# Patient Record
Sex: MALE | Race: WHITE | NOT HISPANIC OR LATINO | Employment: OTHER | URBAN - METROPOLITAN AREA
[De-identification: names, ages, dates, MRNs, and addresses within clinical notes are randomized per-mention and may not be internally consistent; named-entity substitution may affect disease eponyms.]

---

## 2021-09-30 ENCOUNTER — OFFICE VISIT (OUTPATIENT)
Dept: PULMONOLOGY | Facility: CLINIC | Age: 76
End: 2021-09-30

## 2021-09-30 VITALS
DIASTOLIC BLOOD PRESSURE: 80 MMHG | OXYGEN SATURATION: 96 % | BODY MASS INDEX: 26.22 KG/M2 | WEIGHT: 177 LBS | SYSTOLIC BLOOD PRESSURE: 120 MMHG | HEART RATE: 74 BPM | TEMPERATURE: 97.9 F | HEIGHT: 69 IN

## 2021-09-30 DIAGNOSIS — J44.9 COPD MIXED TYPE (HCC): ICD-10-CM

## 2021-09-30 DIAGNOSIS — Z87.891 FORMER SMOKER: ICD-10-CM

## 2021-09-30 DIAGNOSIS — R06.02 SHORTNESS OF BREATH: Primary | ICD-10-CM

## 2021-09-30 PROBLEM — R06.09 DYSPNEA ON EXERTION: Status: ACTIVE | Noted: 2021-09-30

## 2021-09-30 PROCEDURE — 94726 PLETHYSMOGRAPHY LUNG VOLUMES: CPT | Performed by: INTERNAL MEDICINE

## 2021-09-30 PROCEDURE — 99204 OFFICE O/P NEW MOD 45 MIN: CPT | Performed by: INTERNAL MEDICINE

## 2021-09-30 PROCEDURE — 94729 DIFFUSING CAPACITY: CPT | Performed by: INTERNAL MEDICINE

## 2021-09-30 PROCEDURE — 94060 EVALUATION OF WHEEZING: CPT | Performed by: INTERNAL MEDICINE

## 2021-09-30 RX ORDER — PANTOPRAZOLE SODIUM 40 MG/1
40 TABLET, DELAYED RELEASE ORAL DAILY
COMMUNITY
Start: 2021-09-16

## 2021-09-30 RX ORDER — ASPIRIN 81 MG/1
TABLET ORAL
COMMUNITY

## 2021-09-30 RX ORDER — TRIAMTERENE AND HYDROCHLOROTHIAZIDE 37.5; 25 MG/1; MG/1
1 TABLET ORAL DAILY
COMMUNITY
Start: 2021-07-26

## 2021-09-30 RX ORDER — ALBUTEROL SULFATE 90 UG/1
4 AEROSOL, METERED RESPIRATORY (INHALATION) ONCE
Status: COMPLETED | OUTPATIENT
Start: 2021-09-30 | End: 2021-09-30

## 2021-09-30 RX ORDER — ALBUTEROL SULFATE 90 UG/1
2 AEROSOL, METERED RESPIRATORY (INHALATION) EVERY 4 HOURS PRN
COMMUNITY

## 2021-09-30 RX ORDER — ATORVASTATIN CALCIUM 10 MG/1
10 TABLET, FILM COATED ORAL DAILY
COMMUNITY
Start: 2021-07-09

## 2021-09-30 RX ADMIN — ALBUTEROL SULFATE 4 PUFF: 90 AEROSOL, METERED RESPIRATORY (INHALATION) at 13:09

## 2021-09-30 NOTE — PROGRESS NOTES
PULMONARY  NOTE    Chief Complaint     COPD, tobacco abuse, dyspnea on exertion, cough, hypertension    History of Present Illness     76-year-old male referred for evaluation of COPD    He has a long history of tobacco abuse that consisted of up to 1 pack of cigarettes per day  He smoked for over 50 years and stopped smoking about 2 years ago    Has been diagnosed with COPD.  Most recently has been treated with Anoro and albuterol on an as-needed basis  While he does feel that the inhalers help with this respiratory symptoms he has noted that his blood pressure, which he takes every day, increases when he uses the inhalers.  He uses them only intermittently, therefore.    He did require hospitalization for hypertensive emergency resulting in encephalopathy in October 2020    He has a daily cough typically producing white sputum.  He is never had hemoptysis    He has dyspnea on exertion would have difficulty going up a flight of stairs without having to stop due to shortness of breath    He underwent an LDCT in [place] in February as noted below.    Patient Active Problem List   Diagnosis   • Dyspnea on exertion   • Former smoker (Stopped 2019)   • Stage II (Moderate) COPD   • Hypertension     Allergies   Allergen Reactions   • Nystatin Rash       Current Outpatient Medications:   •  albuterol sulfate  (90 Base) MCG/ACT inhaler, Inhale 2 puffs Every 4 (Four) Hours As Needed for Wheezing., Disp: , Rfl:   •  aspirin (aspirin) 81 MG EC tablet, aspirin 81 mg tablet,delayed release, Disp: , Rfl:   •  atorvastatin (LIPITOR) 10 MG tablet, Take 10 mg by mouth Daily., Disp: , Rfl:   •  metoprolol tartrate (LOPRESSOR) 25 MG tablet, , Disp: , Rfl:   •  pantoprazole (PROTONIX) 40 MG EC tablet, Take 40 mg by mouth Daily., Disp: , Rfl:   •  triamterene-hydrochlorothiazide (MAXZIDE-25) 37.5-25 MG per tablet, Take 1 tablet by mouth Daily., Disp: , Rfl:   •  Umeclidinium Bromide (INCRUSE ELLIPTA) 62.5 MCG/INH aerosol  "powder , Inhale., Disp: , Rfl:   No current facility-administered medications for this visit.  MEDICATION LIST AND ALLERGIES REVIEWED.    Family History   Problem Relation Age of Onset   • Diabetes Father    • Hyperlipidemia Father      Social History     Tobacco Use   • Smoking status: Former Smoker     Packs/day: 1.00     Years: 55.00     Pack years: 55.00     Types: Cigarettes     Quit date: 2019     Years since quittin.7   • Smokeless tobacco: Never Used   Substance Use Topics   • Alcohol use: Yes     Comment: rare beer   • Drug use: Never     Social History     Social History Narrative    Smoked up to 1 pack of cigarettes per day, stopped smoking in 2019    Drinks roughly 1 beer on a daily basis         FAMILY AND SOCIAL HISTORY REVIEWED.    Review of Systems  ALSO REFER TO SCANNED ROS SHEET FROM SAME DATE.    /80   Pulse 74   Temp 97.9 °F (36.6 °C)   Ht 174 cm (68.5\")   Wt 80.3 kg (177 lb)   SpO2 96% Comment: resting, room air  BMI 26.52 kg/m²   Physical Exam  Vitals and nursing note reviewed.   Constitutional:       General: He is not in acute distress.     Appearance: He is well-developed. He is not diaphoretic.   HENT:      Head: Normocephalic and atraumatic.   Neck:      Thyroid: No thyromegaly.   Cardiovascular:      Rate and Rhythm: Normal rate and regular rhythm.      Heart sounds: Normal heart sounds. No murmur heard.     Pulmonary:      Effort: Pulmonary effort is normal.      Breath sounds: Normal breath sounds. No stridor.   Abdominal:      General: Bowel sounds are normal.      Palpations: Abdomen is soft.   Musculoskeletal:         General: Normal range of motion.      Comments: Trace pitting lower extremity edema   Lymphadenopathy:      Cervical: No cervical adenopathy.      Upper Body:      Right upper body: No supraclavicular or epitrochlear adenopathy.      Left upper body: No supraclavicular or epitrochlear adenopathy.   Skin:     General: Skin is warm and dry. "   Neurological:      Mental Status: He is alert and oriented to person, place, and time.   Psychiatric:         Behavior: Behavior normal.         Results     PFTs reveal moderate airway obstruction, no significant change in FEV1 after bronchodilator  No restriction in a reduced diffusion capacity    Last LDCT done 2/9/2021 his local hospital revealed no suspicious intrathoracic lesions    Chest x-ray in the office today revealed no suspicious lesions    There is no immunization history on file for this patient.  Problem List       ICD-10-CM ICD-9-CM   1. Shortness of breath  R06.02 786.05   2. Former smoker (Stopped 2019)  Z87.891 V15.82   3. Stage II (Moderate) COPD  J44.9 496       Discussion     We reviewed his PFTs and chest x-ray  His daughter participated in the visit today by video.    He has moderate, stage II, COPD  This is most likely due to his long history of tobacco abuse  I had recommended an alpha-1 antitrypsin screen but the daughter is quite sure that he has had this performed already.    We discussed bronchodilator therapy.  It appears that both the LABA, LAMA, and RICKEY medications aggravate his blood pressure.  He has quite definite that the use of Anoro and albuterol lead to an increase in blood pressure.  Given his serious hypertensive crisis, I have recommended against these medications if his blood pressure is not controlled  As this most likely represents a class effect, I am not sure that there are any other inhalers to try.  I do not think an ICS alone is indicated.    He does not have significant air trapping and is not a candidate for endobronchial valve therapy.    I recommended pulmonary rehabilitation.  We will see if we can facilitate that referral in [place].    He may have nocturnal desaturation and may benefit from supplemental oxygen.  We will check 2 nights of nocturnal pulse oximetry    I will plan to see him back in 6 months or earlier if there are any problems before  then    Moderate level of Medical Decision Making complexity based on 1 undiagnosed new problem and moderate amount and/or complexity of data review/analysis    Ricki Kong MD  Note electronically signed    CC: Kirt Cano, DO

## 2021-10-01 DIAGNOSIS — J44.9 COPD MIXED TYPE (HCC): Primary | ICD-10-CM

## 2021-10-13 ENCOUNTER — TRANSCRIBE ORDERS (OUTPATIENT)
Dept: PULMONOLOGY | Facility: CLINIC | Age: 76
End: 2021-10-13

## 2022-03-25 ENCOUNTER — OFFICE VISIT (OUTPATIENT)
Dept: PULMONOLOGY | Facility: CLINIC | Age: 77
End: 2022-03-25

## 2022-03-25 VITALS
TEMPERATURE: 98 F | OXYGEN SATURATION: 95 % | DIASTOLIC BLOOD PRESSURE: 88 MMHG | BODY MASS INDEX: 26.81 KG/M2 | HEART RATE: 71 BPM | WEIGHT: 181 LBS | HEIGHT: 69 IN | SYSTOLIC BLOOD PRESSURE: 126 MMHG | RESPIRATION RATE: 18 BRPM

## 2022-03-25 DIAGNOSIS — Z87.891 FORMER SMOKER: ICD-10-CM

## 2022-03-25 DIAGNOSIS — J44.9 COPD MIXED TYPE: ICD-10-CM

## 2022-03-25 DIAGNOSIS — R06.09 DYSPNEA ON EXERTION: Primary | ICD-10-CM

## 2022-03-25 PROCEDURE — 99214 OFFICE O/P EST MOD 30 MIN: CPT | Performed by: INTERNAL MEDICINE

## 2022-03-25 RX ORDER — THEOPHYLLINE 300 MG/1
150 TABLET, EXTENDED RELEASE ORAL 2 TIMES DAILY
Qty: 30 TABLET | Refills: 11 | Status: SHIPPED | OUTPATIENT
Start: 2022-03-25

## 2022-03-25 NOTE — PROGRESS NOTES
PULMONARY  NOTE    Chief Complaint     Former smoker, stage II COPD, dyspnea on exertion, hypertension    History of Present Illness     74-year-old male returns today for follow-up  I last saw him 9/30/2021    He has a 50-pack-year smoking history, stopped about 2 years ago    He has stage II, moderate, COPD  He has been intolerant of multiple inhalers, particularly LAMA and LABA-containing inhalers    He continues to use albuterol intermittently although feels that it affects his blood pressure, as well    He has dyspnea on exertion and would have difficulty going up a flight of stairs without having to stop due to shortness of breath    He has not yet had his LDCT for 2022    Patient Active Problem List   Diagnosis   • Dyspnea on exertion   • Former smoker (Stopped 2019)   • Stage II (Moderate) COPD   • Hypertension     Allergies   Allergen Reactions   • Nystatin Rash       Current Outpatient Medications:   •  albuterol sulfate  (90 Base) MCG/ACT inhaler, Inhale 2 puffs Every 4 (Four) Hours As Needed for Wheezing., Disp: , Rfl:   •  aspirin 81 MG EC tablet, aspirin 81 mg tablet,delayed release, Disp: , Rfl:   •  atorvastatin (LIPITOR) 10 MG tablet, Take 10 mg by mouth Daily., Disp: , Rfl:   •  metoprolol tartrate (LOPRESSOR) 25 MG tablet, , Disp: , Rfl:   •  pantoprazole (PROTONIX) 40 MG EC tablet, Take 40 mg by mouth Daily., Disp: , Rfl:   •  triamterene-hydrochlorothiazide (MAXZIDE-25) 37.5-25 MG per tablet, Take 1 tablet by mouth Daily., Disp: , Rfl:   •  Umeclidinium Bromide (INCRUSE ELLIPTA) 62.5 MCG/INH aerosol powder , Inhale., Disp: , Rfl:   MEDICATION LIST AND ALLERGIES REVIEWED.    Family History   Problem Relation Age of Onset   • Diabetes Father    • Hyperlipidemia Father      Social History     Tobacco Use   • Smoking status: Former Smoker     Packs/day: 1.00     Years: 55.00     Pack years: 55.00     Types: Cigarettes     Quit date: 2019     Years since quitting: 3.2   • Smokeless tobacco:  "Never Used   Substance Use Topics   • Alcohol use: Yes     Comment: rare beer   • Drug use: Never     Social History     Social History Narrative    Smoked up to 1 pack of cigarettes per day, stopped smoking in 2019    Drinks roughly 1 beer on a daily basis         FAMILY AND SOCIAL HISTORY REVIEWED.    Review of Systems  ALSO REFER TO SCANNED ROS SHEET FROM SAME DATE.    /88   Pulse 71   Temp 98 °F (36.7 °C)   Resp 18   Ht 174 cm (68.5\")   Wt 82.1 kg (181 lb)   SpO2 95% Comment: room air  BMI 27.12 kg/m²   Physical Exam  Vitals and nursing note reviewed.   Constitutional:       General: He is not in acute distress.     Appearance: He is well-developed. He is not diaphoretic.   HENT:      Head: Normocephalic and atraumatic.   Neck:      Thyroid: No thyromegaly.   Cardiovascular:      Rate and Rhythm: Normal rate and regular rhythm.      Heart sounds: Normal heart sounds. No murmur heard.  Pulmonary:      Effort: Pulmonary effort is normal.      Breath sounds: Normal breath sounds. No stridor.   Chest:   Breasts:      Right: No supraclavicular adenopathy.      Left: No supraclavicular adenopathy.       Lymphadenopathy:      Cervical: No cervical adenopathy.      Upper Body:      Right upper body: No supraclavicular or epitrochlear adenopathy.      Left upper body: No supraclavicular or epitrochlear adenopathy.   Skin:     General: Skin is warm and dry.   Neurological:      Mental Status: He is alert and oriented to person, place, and time.   Psychiatric:         Behavior: Behavior normal.         Results     Immunization History   Administered Date(s) Administered   • COVID-19 (PFIZER) PURPLE CAP 01/29/2021, 02/19/2021, 10/11/2021     Problem List       ICD-10-CM ICD-9-CM   1. Dyspnea on exertion  R06.00 786.09   2. Stage II (Moderate) COPD  J44.9 496   3. Former smoker (Stopped 2019)  Z87.891 V15.82       Discussion     His daughter joined the appointment via phone    We reviewed his diagnosis " and classes of medications for the treatment of symptoms of COPD  Unfortunately, he appears to be intolerant of the LAMA and LABA  I do not think ICS alone is going to add anything  He can tolerate RICKEY to a degree  Roflumilast, at the doses it can be tolerated, are probably not can to provide much bronchodilating effect  We did talk about using an old agent, theophylline, and he and her daughter are interested in trying that  We did discuss potential side effects.  I am going to prescribe theater and he can continue to use albuterol up to 4 times a day as needed    I recommended a repeat LDCT  He has not had that done yet so we will get that set up in [place]    We will get him reassess for the need for not sternal oxygen supplementation    I will plan to see him back after the above    Moderate level of Medical Decision Making complexity based on 2 or more chronic stable illnesses and prescription drug management.    Ricki Kong MD  Note electronically signed    CC: Kirt Cano,

## 2022-04-13 ENCOUNTER — TRANSCRIBE ORDERS (OUTPATIENT)
Dept: PULMONOLOGY | Facility: CLINIC | Age: 77
End: 2022-04-13

## 2022-05-04 ENCOUNTER — TELEPHONE (OUTPATIENT)
Dept: PULMONOLOGY | Facility: CLINIC | Age: 77
End: 2022-05-04

## 2022-05-04 DIAGNOSIS — Z00.6 EXAMINATION FOR NORMAL COMPARISON OR CONTROL IN CLINICAL RESEARCH: Primary | ICD-10-CM

## 2022-05-04 NOTE — TELEPHONE ENCOUNTER
Patient's daughter called to inform us that patient has discontinued using theophylline, made him unsteady on his feet.

## 2022-05-04 NOTE — TELEPHONE ENCOUNTER
"Patient\"s daughter called requesting oxygen order to be sent for her father. I informed her that without an office visit with notes reflecting the order from the physician as well as a qualifying 6 minute walk test, that I was unable to do that for her. She became upset and said that father was struggling and she \"wanted something done.\" After reviewing last office visit notes, pt should have had an overnight pulse oximetry order performed. I informed the daughter that I would re-send order to local DME company and they should contact her within a couple days to set up. I also informed her that after that test was done, her father would still possibly need an office visit to qualify for daytime oxygen, but that would be Dr. Kong's decision. She was agreeable to this.    Overnight pulse oximetry sent to [email or web address] in [place].  "

## 2022-05-04 NOTE — TELEPHONE ENCOUNTER
Patient's daughter called to request home oxygen for patient, transferred her to RT to see if this could be ordered. He is still short of breath and seems miserable. He stopped taking theophylline because it caused him to feel unsteady on his feet. She also wanted to see if further diagnostics could help find answers needed to improve his symptoms? She did not want to schedule an appointment unless there were diagnostics that could be done as they live over two hours away.

## 2022-05-04 NOTE — TELEPHONE ENCOUNTER
I returned Mr. Bass's daughter, Suzie, call regarding her father's condition.  We did discuss per Dr. Kong's note that he has tried multiple inhalers to help with his chronic dyspnea without success.  He is also tried theophylline which caused some side effects.  He is very intolerant to medications.  He did recently have a CT scan of the chest done at [place], we will see if we can find that report and see if there are any changes.  He has been a bit more short of breath with activity recently.  She has also reached out to respiratory therapy to see if we can get some oxygen ordered for him.  We will follow-up on his overnight pulse oximetry study to see if he qualifies for oxygen.  I will call her back and let her know what the CT scan shows.

## 2022-05-09 DIAGNOSIS — Z87.891 FORMER SMOKER: ICD-10-CM

## 2022-05-09 DIAGNOSIS — R06.09 DYSPNEA ON EXERTION: ICD-10-CM

## 2022-05-09 DIAGNOSIS — J44.9 COPD MIXED TYPE: ICD-10-CM

## 2022-06-08 ENCOUNTER — OFFICE VISIT (OUTPATIENT)
Dept: PULMONOLOGY | Facility: CLINIC | Age: 77
End: 2022-06-08

## 2022-06-08 VITALS
BODY MASS INDEX: 26.51 KG/M2 | TEMPERATURE: 98 F | DIASTOLIC BLOOD PRESSURE: 80 MMHG | SYSTOLIC BLOOD PRESSURE: 122 MMHG | OXYGEN SATURATION: 95 % | HEART RATE: 76 BPM | WEIGHT: 179 LBS | HEIGHT: 69 IN

## 2022-06-08 DIAGNOSIS — R06.09 DYSPNEA ON EXERTION: ICD-10-CM

## 2022-06-08 DIAGNOSIS — Z87.891 FORMER SMOKER: ICD-10-CM

## 2022-06-08 DIAGNOSIS — K44.9 HIATAL HERNIA: ICD-10-CM

## 2022-06-08 DIAGNOSIS — J44.9 COPD MIXED TYPE: Primary | ICD-10-CM

## 2022-06-08 PROCEDURE — 94618 PULMONARY STRESS TESTING: CPT | Performed by: INTERNAL MEDICINE

## 2022-06-08 PROCEDURE — 99214 OFFICE O/P EST MOD 30 MIN: CPT | Performed by: INTERNAL MEDICINE

## 2022-06-08 RX ORDER — TIOTROPIUM BROMIDE 18 UG/1
CAPSULE ORAL; RESPIRATORY (INHALATION)
COMMUNITY
Start: 2022-05-09

## 2022-06-08 NOTE — PROGRESS NOTES
"  PULMONARY  NOTE    Chief Complaint     Former smoker, stage II COPD, dyspnea on exertion, hypertension, hiatal hernia    History of Present Illness     77-year-old male returns today for follow-up  His daughter accompanied him via telephone during his office visit today  I last saw him 3/25/2022    He has a history of tobacco abuse, resolved in 2019    He has stage II, moderate COPD  He has been trialed on multiple inhalers in the past  This is included short acting bronchodilators, long-acting bronchodilators and long-acting anticholinergic agents  He has been intolerant of these inhalers  Typically indicates that his \"blood pressure goes crazy\"  Therefore, he is not on any respiratory medications    He continues to have dyspnea on exertion  He and his daughter feel that his shortness of breath is getting worse  It interferes with his activities of daily living    He has had no acute exacerbation of COPD since I last saw him and has not required hospitalization or emergency room visit    Patient Active Problem List   Diagnosis   • Dyspnea on exertion   • Former smoker (Stopped 2019)   • Stage II (Moderate) COPD   • Hypertension   • Hiatal hernia     Allergies   Allergen Reactions   • Nystatin Rash       Current Outpatient Medications:   •  albuterol sulfate  (90 Base) MCG/ACT inhaler, Inhale 2 puffs Every 4 (Four) Hours As Needed for Wheezing., Disp: , Rfl:   •  aspirin 81 MG EC tablet, aspirin 81 mg tablet,delayed release, Disp: , Rfl:   •  atorvastatin (LIPITOR) 10 MG tablet, Take 10 mg by mouth Daily., Disp: , Rfl:   •  metoprolol tartrate (LOPRESSOR) 25 MG tablet, , Disp: , Rfl:   •  pantoprazole (PROTONIX) 40 MG EC tablet, Take 40 mg by mouth Daily., Disp: , Rfl:   •  Spiriva HandiHaler 18 MCG per inhalation capsule, INHALE CONTENTS OF ONE CAPSULE USING HANDIHALER ONCE DAILY, Disp: , Rfl:   •  theophylline (THEODUR) 300 MG 12 hr tablet, Take 0.5 tablets by mouth 2 (Two) Times a Day., Disp: 30 tablet, " "Rfl: 11  •  triamterene-hydrochlorothiazide (MAXZIDE-25) 37.5-25 MG per tablet, Take 1 tablet by mouth Daily., Disp: , Rfl:   •  Umeclidinium Bromide (INCRUSE ELLIPTA) 62.5 MCG/INH aerosol powder , Inhale., Disp: , Rfl:   MEDICATION LIST AND ALLERGIES REVIEWED.    Family History   Problem Relation Age of Onset   • Diabetes Father    • Hyperlipidemia Father      Social History     Tobacco Use   • Smoking status: Former Smoker     Packs/day: 1.00     Years: 55.00     Pack years: 55.00     Types: Cigarettes     Quit date: 2019     Years since quitting: 3.4   • Smokeless tobacco: Never Used   Substance Use Topics   • Alcohol use: Yes     Comment: rare beer   • Drug use: Never     Social History     Social History Narrative    Smoked up to 1 pack of cigarettes per day, stopped smoking in 2019    Drinks roughly 1 beer on a daily basis         FAMILY AND SOCIAL HISTORY REVIEWED.    Review of Systems  ALSO REFER TO SCANNED ROS SHEET FROM SAME DATE.    /80   Pulse 76   Temp 98 °F (36.7 °C)   Ht 174 cm (68.5\")   Wt 81.2 kg (179 lb)   SpO2 95% Comment: resting, room air  BMI 26.82 kg/m²   Physical Exam  Vitals and nursing note reviewed.   Constitutional:       General: He is not in acute distress.     Appearance: He is well-developed. He is not diaphoretic.   HENT:      Head: Normocephalic and atraumatic.   Neck:      Thyroid: No thyromegaly.   Cardiovascular:      Rate and Rhythm: Normal rate and regular rhythm.      Heart sounds: Normal heart sounds. No murmur heard.  Pulmonary:      Effort: Pulmonary effort is normal.      Breath sounds: Normal breath sounds. No stridor.   Chest:   Breasts:      Right: No supraclavicular adenopathy.      Left: No supraclavicular adenopathy.       Lymphadenopathy:      Cervical: No cervical adenopathy.      Upper Body:      Right upper body: No supraclavicular or epitrochlear adenopathy.      Left upper body: No supraclavicular or epitrochlear adenopathy.   Skin:     " General: Skin is warm and dry.   Neurological:      Mental Status: He is alert and oriented to person, place, and time.   Psychiatric:         Behavior: Behavior normal.         Results     Low-dose CT scan of the chest done in [place] on 4/19/2022 reviewed  No suspicious intrathoracic findings  Moderate hiatal hernia again demonstrated  Diffuse emphysematous changes noted, as well    Nocturnal pulse oximetry failed to show significant nocturnal desaturation on room air    Exercise pulse oximetry done in our office today revealed exercise-induced desaturation after walking 1200 feet on a level surface.  However, not to a degree that would require supplemental oxygen  Low with saturation was 91-92%    Immunization History   Administered Date(s) Administered   • COVID-19 (PFIZER) PURPLE CAP 01/29/2021, 02/19/2021, 10/11/2021   • Fluzone High Dose =>65 Years ([employer] ONLY) 11/13/2020, 11/22/2021   • Pneumococcal Conjugate 13-Valent (PCV13) 11/13/2020     Problem List       ICD-10-CM ICD-9-CM   1. Dyspnea on exertion  R06.00 786.09   2. Former smoker (Stopped 2019)  Z87.891 V15.82   3. Stage II (Moderate) COPD  J44.9 496   4. Hiatal hernia  K44.9 553.3       Discussion     We reviewed his test results  No suspicious lesions on his CT scan  I would recommend a repeat LDCT in April 2023    We again discussed medical therapy for his moderate chronic obstructive pulmonary disease  Unfortunately, he has not been able to really tolerate any inhaler because of blood pressure issues  I think that if he were on a bronchodilator on a regular basis that would probably help with his dyspnea    We spent quite a bit of time talking about his hiatal hernia  He does feel that he has a lot of shortness of breath rib related around meals  This may be related to gastric distention aggravating his shortness of breath    He and his daughter are interested in being referred to a general surgeon to discuss hiatal hernia surgery    I will  plan to see him back in 6 months or earlier if there are any problems in the meantime    Moderate level of Medical Decision Making complexity based on 2 or more chronic stable illnesses and prescription drug management.    Ricki Kong MD  Note electronically signed    CC: Kirt Cano, DO

## 2022-06-10 DIAGNOSIS — K44.9 HIATAL HERNIA: Primary | ICD-10-CM

## 2022-07-14 ENCOUNTER — PREP FOR SURGERY (OUTPATIENT)
Dept: PULMONOLOGY | Facility: CLINIC | Age: 77
End: 2022-07-14

## 2022-09-20 RX ORDER — BUDESONIDE, GLYCOPYRROLATE, AND FORMOTEROL FUMARATE 160; 9; 4.8 UG/1; UG/1; UG/1
2 AEROSOL, METERED RESPIRATORY (INHALATION) 2 TIMES DAILY
Qty: 1 EACH | Refills: 11 | Status: SHIPPED | OUTPATIENT
Start: 2022-09-20

## 2022-09-21 ENCOUNTER — TELEPHONE (OUTPATIENT)
Dept: PULMONOLOGY | Facility: CLINIC | Age: 77
End: 2022-09-21

## 2023-04-06 ENCOUNTER — OFFICE VISIT (OUTPATIENT)
Dept: PULMONOLOGY | Facility: CLINIC | Age: 78
End: 2023-04-06
Payer: MEDICARE

## 2023-04-06 VITALS
HEART RATE: 76 BPM | HEIGHT: 70 IN | TEMPERATURE: 97.5 F | BODY MASS INDEX: 25.6 KG/M2 | WEIGHT: 178.8 LBS | SYSTOLIC BLOOD PRESSURE: 128 MMHG | OXYGEN SATURATION: 94 % | DIASTOLIC BLOOD PRESSURE: 76 MMHG

## 2023-04-06 DIAGNOSIS — K44.9 HIATAL HERNIA: ICD-10-CM

## 2023-04-06 DIAGNOSIS — R06.09 DYSPNEA ON EXERTION: ICD-10-CM

## 2023-04-06 DIAGNOSIS — Z87.891 FORMER SMOKER: ICD-10-CM

## 2023-04-06 DIAGNOSIS — J44.9 COPD MIXED TYPE: Primary | ICD-10-CM

## 2023-04-06 PROCEDURE — 3074F SYST BP LT 130 MM HG: CPT | Performed by: INTERNAL MEDICINE

## 2023-04-06 PROCEDURE — 94726 PLETHYSMOGRAPHY LUNG VOLUMES: CPT | Performed by: INTERNAL MEDICINE

## 2023-04-06 PROCEDURE — 1160F RVW MEDS BY RX/DR IN RCRD: CPT | Performed by: INTERNAL MEDICINE

## 2023-04-06 PROCEDURE — 3078F DIAST BP <80 MM HG: CPT | Performed by: INTERNAL MEDICINE

## 2023-04-06 PROCEDURE — 1159F MED LIST DOCD IN RCRD: CPT | Performed by: INTERNAL MEDICINE

## 2023-04-06 PROCEDURE — 94060 EVALUATION OF WHEEZING: CPT | Performed by: INTERNAL MEDICINE

## 2023-04-06 PROCEDURE — 94729 DIFFUSING CAPACITY: CPT | Performed by: INTERNAL MEDICINE

## 2023-04-06 PROCEDURE — 99214 OFFICE O/P EST MOD 30 MIN: CPT | Performed by: INTERNAL MEDICINE

## 2023-04-06 RX ORDER — LEVALBUTEROL TARTRATE 45 UG/1
3 AEROSOL, METERED ORAL ONCE
Status: COMPLETED | OUTPATIENT
Start: 2023-04-06 | End: 2023-04-06

## 2023-04-06 RX ADMIN — LEVALBUTEROL TARTRATE 3 PUFF: 45 AEROSOL, METERED ORAL at 16:01

## 2023-04-06 NOTE — PROGRESS NOTES
PULMONARY  NOTE    Chief Complaint     Stage II COPD, dyspnea on exertion, former smoker, hiatal hernia    History of Present Illness     78-year-old male returns today for follow-up  I last saw him 6/8/2022    He has a history of tobacco abuse, resolved in 2019    He has moderate chronic obstructive pulmonary disease  He has been intolerant of many inhalers in the past  He primarily is just using albuterol on an as-needed basis    He does have dyspnea on exertion  He feels that things are a little bit better since he has been participating in physical therapy/pulmonary rehabilitation    No acute exacerbation of COPD since I last saw him    History of a hiatal hernia but no regular reflux symptoms currently    Previous pulse oximetry including nocturnal pulse oximetry and exercise pulse oximetry failed to show significant desaturation to the degree that would require supplementation    Patient Active Problem List   Diagnosis   • Dyspnea on exertion   • Former smoker (Stopped 2019)   • Stage II (Moderate) COPD   • Hypertension   • Hiatal hernia     Allergies   Allergen Reactions   • Nystatin Rash       Current Outpatient Medications:   •  albuterol sulfate  (90 Base) MCG/ACT inhaler, Inhale 2 puffs Every 4 (Four) Hours As Needed for Wheezing., Disp: , Rfl:   •  aspirin 81 MG EC tablet, aspirin 81 mg tablet,delayed release, Disp: , Rfl:   •  atorvastatin (LIPITOR) 10 MG tablet, Take 1 tablet by mouth Daily., Disp: , Rfl:   •  Budeson-Glycopyrrol-Formoterol (Breztri Aerosphere) 160-9-4.8 MCG/ACT aerosol inhaler, Inhale 2 puffs 2 (Two) Times a Day., Disp: 1 each, Rfl: 11  •  metoprolol tartrate (LOPRESSOR) 25 MG tablet, , Disp: , Rfl:   •  pantoprazole (PROTONIX) 40 MG EC tablet, Take 1 tablet by mouth Daily., Disp: , Rfl:   •  Spiriva HandiHaler 18 MCG per inhalation capsule, INHALE CONTENTS OF ONE CAPSULE USING HANDIHALER ONCE DAILY, Disp: , Rfl:   •  theophylline (THEODUR) 300 MG 12 hr tablet, Take 0.5  "tablets by mouth 2 (Two) Times a Day., Disp: 30 tablet, Rfl: 11  •  triamterene-hydrochlorothiazide (MAXZIDE-25) 37.5-25 MG per tablet, Take 1 tablet by mouth Daily., Disp: , Rfl:   •  Umeclidinium Bromide (INCRUSE ELLIPTA) 62.5 MCG/INH aerosol powder , Inhale., Disp: , Rfl:   No current facility-administered medications for this visit.  MEDICATION LIST AND ALLERGIES REVIEWED.    Family History   Problem Relation Age of Onset   • Diabetes Father    • Hyperlipidemia Father      Social History     Tobacco Use   • Smoking status: Former     Packs/day: 1.00     Years: 55.00     Pack years: 55.00     Types: Cigarettes     Quit date: 2019     Years since quittin.2   • Smokeless tobacco: Never   Substance Use Topics   • Alcohol use: Yes     Comment: rare beer   • Drug use: Never     Social History     Social History Narrative    Smoked up to 1 pack of cigarettes per day, stopped smoking in 2019    Drinks roughly 1 beer on a daily basis         FAMILY AND SOCIAL HISTORY REVIEWED.    Review of Systems  IF PRESENT REFER TO SCANNED ROS SHEET FROM SAME DATE  OTHERWISE ROS OBTAINED AND NON-CONTRIBUTORY OVER HPI.    /76 (BP Location: Left arm, Patient Position: Sitting, Cuff Size: Adult)   Pulse 76   Temp 97.5 °F (36.4 °C)   Ht 177.8 cm (70\")   Wt 81.1 kg (178 lb 12.8 oz)   SpO2 94%   BMI 25.66 kg/m²   Physical Exam  Vitals and nursing note reviewed.   Constitutional:       General: He is not in acute distress.     Appearance: He is well-developed. He is not diaphoretic.   HENT:      Head: Normocephalic and atraumatic.   Neck:      Thyroid: No thyromegaly.   Cardiovascular:      Rate and Rhythm: Normal rate and regular rhythm.      Heart sounds: Normal heart sounds. No murmur heard.  Pulmonary:      Effort: Pulmonary effort is normal.      Breath sounds: Normal breath sounds. No stridor.   Lymphadenopathy:      Cervical: No cervical adenopathy.      Upper Body:      Right upper body: No supraclavicular or " epitrochlear adenopathy.      Left upper body: No supraclavicular or epitrochlear adenopathy.   Skin:     General: Skin is warm and dry.   Neurological:      Mental Status: He is alert and oriented to person, place, and time.   Psychiatric:         Behavior: Behavior normal.         Results     PFTs reveal moderate severe airway obstruction, no significant change in FEV1 after bronchodilator  No restriction and a reduced diffusion capacity  Values are roughly stable in comparison to prior PFTs    Immunization History   Administered Date(s) Administered   • COVID-19 (PFIZER) PURPLE CAP 01/29/2021, 02/19/2021, 10/11/2021   • Fluzone High Dose =>65 Years ([employer] ONLY) 11/13/2020, 11/22/2021   • Pneumococcal Conjugate 13-Valent (PCV13) 11/13/2020     Problem List       ICD-10-CM ICD-9-CM   1. COPD mixed type  J44.9 496   2. Former smoker (Stopped 2019)  Z87.891 V15.82   3. Hiatal hernia  K44.9 553.3   4. Dyspnea on exertion  R06.09 786.09       Discussion     We reviewed his PFTs.  His daughter accompanied him via phone    Values are roughly stable  Symptoms are stable  He does feel that he is doing a little bit better with the pulmonary rehab    He has not had his LDCT yet  We will arrange that  Assuming there is nothing suspicious on his LDCT I will see him back in 6 months for follow-up    He will continue to use albuterol on an as-needed basis for his chronic obstructive pulmonary disease    Moderate level of Medical Decision Making complexity based on 2 or more chronic stable illnesses and an independent review of test results and/or prescription drug management.    Ricki Kong MD  Note electronically signed    CC: Kirt Cano,

## 2023-04-10 DIAGNOSIS — Z87.891 PERSONAL HISTORY OF NICOTINE DEPENDENCE: Primary | ICD-10-CM

## 2023-09-18 ENCOUNTER — TELEPHONE (OUTPATIENT)
Dept: PULMONOLOGY | Facility: CLINIC | Age: 78
End: 2023-09-18
Payer: MEDICARE

## 2023-09-18 NOTE — TELEPHONE ENCOUNTER
PA for Rx Breztri approved from 6/19/23-9/17/24. Pharmacy notified and informed me that prescription did go through but the cost of inhaler will be $351.69. called pt but no answer/no VM. Pt will need to contact insurance for an alternate inhaler that will be something he will be able to afford.

## 2023-09-27 ENCOUNTER — TELEPHONE (OUTPATIENT)
Dept: PULMONOLOGY | Facility: CLINIC | Age: 78
End: 2023-09-27
Payer: MEDICARE

## 2023-09-27 NOTE — TELEPHONE ENCOUNTER
Pt's daughter Suzie called today requesting a call back @ [phone] regarding pt's breathing. Pt has been having a lot of sob/cough w/ sputum mainly at night time. Pt feels like he is not getting any relief and not sure what more pt needs to be doing. Pt admits to taking Albuterol HFA and Neb Sol. Suzie is not willing to schedule an apt at this time and really wanting to wait until Dr Kong is back in the office but okay to discuss this further with an APRN.

## 2023-09-28 ENCOUNTER — TELEPHONE (OUTPATIENT)
Dept: PULMONOLOGY | Facility: CLINIC | Age: 78
End: 2023-09-28
Payer: MEDICARE

## 2023-09-28 DIAGNOSIS — J44.9 COPD MIXED TYPE: Primary | ICD-10-CM

## 2023-09-28 NOTE — PROGRESS NOTES
I spoke with Mr. Bass's daughter and her father is having difficulty with sputum at night.  He has tried and failed multiple inhalers and is willing to try Yupelri daily.  I did send this prescription into his local pharmacy.  If it is expensive he is can have some family members come to the office and we will give him some samples of Yupelri.    His daughter will let me know if he needs to come in sooner.  She had also discussed maybe getting a referral to a pulmonologist closer to his home as he is having difficulty making the trip to this office.

## 2025-03-03 ENCOUNTER — OFFICE VISIT (OUTPATIENT)
Dept: PULMONOLOGY | Facility: CLINIC | Age: 80
End: 2025-03-03
Payer: MEDICARE

## 2025-03-03 ENCOUNTER — TELEPHONE (OUTPATIENT)
Dept: SLEEP MEDICINE | Age: 80
End: 2025-03-03
Payer: MEDICARE

## 2025-03-03 VITALS
WEIGHT: 178 LBS | OXYGEN SATURATION: 95 % | TEMPERATURE: 98.2 F | BODY MASS INDEX: 25.54 KG/M2 | SYSTOLIC BLOOD PRESSURE: 130 MMHG | DIASTOLIC BLOOD PRESSURE: 90 MMHG | HEART RATE: 73 BPM

## 2025-03-03 DIAGNOSIS — J96.11 CHRONIC RESPIRATORY FAILURE WITH HYPOXIA: ICD-10-CM

## 2025-03-03 DIAGNOSIS — R05.3 CHRONIC COUGH: ICD-10-CM

## 2025-03-03 DIAGNOSIS — J44.9 COPD MIXED TYPE: Primary | ICD-10-CM

## 2025-03-03 DIAGNOSIS — K21.9 GASTROESOPHAGEAL REFLUX DISEASE, UNSPECIFIED WHETHER ESOPHAGITIS PRESENT: ICD-10-CM

## 2025-03-03 DIAGNOSIS — J96.11 CHRONIC RESPIRATORY FAILURE WITH HYPOXIA: Primary | ICD-10-CM

## 2025-03-03 PROCEDURE — 3075F SYST BP GE 130 - 139MM HG: CPT | Performed by: NURSE PRACTITIONER

## 2025-03-03 PROCEDURE — 3080F DIAST BP >= 90 MM HG: CPT | Performed by: NURSE PRACTITIONER

## 2025-03-03 PROCEDURE — 99214 OFFICE O/P EST MOD 30 MIN: CPT | Performed by: NURSE PRACTITIONER

## 2025-03-03 RX ORDER — CLOTRIMAZOLE AND BETAMETHASONE DIPROPIONATE 10; .64 MG/G; MG/G
CREAM TOPICAL
COMMUNITY

## 2025-03-03 RX ORDER — GABAPENTIN 100 MG/1
CAPSULE ORAL
COMMUNITY
End: 2025-03-03

## 2025-03-03 RX ORDER — FUROSEMIDE 40 MG/1
1 TABLET ORAL DAILY
COMMUNITY
End: 2025-03-03

## 2025-03-03 RX ORDER — TIOTROPIUM BROMIDE 18 UG/1
CAPSULE ORAL; RESPIRATORY (INHALATION)
COMMUNITY

## 2025-03-03 RX ORDER — MULTIVITAMIN,THERAPEUTIC
1 TABLET ORAL DAILY
COMMUNITY

## 2025-03-03 RX ORDER — IPRATROPIUM BROMIDE 42 UG/1
SPRAY, METERED NASAL
COMMUNITY
Start: 2024-10-06 | End: 2025-03-03

## 2025-03-03 RX ORDER — DEXLANSOPRAZOLE 60 MG/1
60 CAPSULE, DELAYED RELEASE ORAL DAILY
Qty: 30 CAPSULE | Refills: 3 | Status: SHIPPED | OUTPATIENT
Start: 2025-03-03

## 2025-03-03 RX ORDER — SODIUM CHLORIDE FOR INHALATION 3 %
4 VIAL, NEBULIZER (ML) INHALATION AS NEEDED
Qty: 240 ML | Refills: 5 | Status: SHIPPED | OUTPATIENT
Start: 2025-03-03

## 2025-03-03 RX ORDER — ONDANSETRON 4 MG/1
TABLET, FILM COATED ORAL
COMMUNITY
End: 2025-03-03

## 2025-03-03 RX ORDER — FLUTICASONE PROPIONATE 50 MCG
SPRAY, SUSPENSION (ML) NASAL
COMMUNITY
End: 2025-03-03

## 2025-03-03 RX ORDER — PSEUDOEPHEDRINE HCL 30 MG
TABLET ORAL
COMMUNITY

## 2025-03-03 RX ORDER — DOXYCYCLINE 100 MG/1
CAPSULE ORAL
COMMUNITY
End: 2025-03-03

## 2025-03-03 NOTE — TELEPHONE ENCOUNTER
[facility] called, they received fax order for overnight sleep study and they don do that

## 2025-03-03 NOTE — PROGRESS NOTES
[facility] Follow up      Chief Complaint  COPD    Subjective          Som Bass presents to [facility] for worsening cough and congestion and shortness of breath.  He was last seen here in the office by Dr. Kong in April 2023 For management of his COPD.      He notes for quite a while he has been a bit more short of breath with lately.  He also has had recurrent episodes of bronchitis treated with antibiotics and steroids.  Most recently he had an episode where he went to his primary care provider and was felt to have early pneumonia and put on antibiotics and steroids again.  During that episode he was noted to have a reflux and aspiration episode just prior.  He said the day prior he had a aspiration episode started running fevers that night and then went to his primary care provider's office.    He does continue on his Spiriva daily but really does not feel like that is helping.  He does have albuterol nebulizers at home but uses them infrequently secondary to tremors.  He has tried multiple other inhalers in the past but has had other side effects.    He also complains of some postnasal drainage and chronic sinus mucus.  He has tried Mucinex in the past as well as nasal sprays.    He was seen by an pulmonologist in [place] and was started on oxygen, but he has not been wearing his oxygen.  He feels like he does not really need it right now.  His DME is [employer] [facility].    He does have a history of a hiatal hernia he had it stretched at some point he says with no trouble swallowing after that.  He has been having some worsening reflux and aspiration episodes last few weeks.  Currently he is on Protonix and has been on that for several years.  He sleeps either on his side or propped up at night.      Objective     Vital Signs:   /90   Pulse 73   Temp 98.2 °F (36.8 °C)   Wt 80.7 kg (178 lb)   SpO2 95%  Comment: room air at rest  BMI 25.54 kg/m²       Immunization History   Administered Date(s) Administered    COVID-19 (PFIZER) Purple Cap Monovalent 01/29/2021, 02/19/2021, 10/11/2021, 11/15/2022    Fluzone High-Dose 65+YRS 11/13/2020, 11/22/2021, 11/15/2022    Influenza MDCK Quadrivalent with Preserative 01/10/2024    Pneumococcal Conjugate 13-Valent (PCV13) 11/13/2020       Physical Exam  Vitals reviewed.   Constitutional:       General: He is not in acute distress.     Appearance: He is well-developed.   HENT:      Head: Normocephalic and atraumatic.   Eyes:      Pupils: Pupils are equal, round, and reactive to light.   Cardiovascular:      Rate and Rhythm: Normal rate and regular rhythm.      Heart sounds: No murmur heard.  Pulmonary:      Effort: Pulmonary effort is normal. No respiratory distress.      Breath sounds: Rhonchi and rales present. No wheezing.   Abdominal:      General: Bowel sounds are normal. There is no distension.      Palpations: Abdomen is soft.   Musculoskeletal:         General: Normal range of motion.      Cervical back: Normal range of motion and neck supple.   Skin:     General: Skin is warm and dry.      Findings: No erythema.   Neurological:      Mental Status: He is alert and oriented to person, place, and time.   Psychiatric:         Behavior: Behavior normal.          Result Review :            PFTs done in the office today:  Moderate obstructive airway disease with an FEV1 of 1.92, 70% predicted, normal total lung capacity, decreased DLCO.      PA and lateral chest x-ray done the office today reviewed by me  Awaiting final MD interpretation    6-minute walk test in the office showed desaturations to 89 to 90% but never dropped below 89.  He was able to walk 1100 feet, 67% predicted.                 Assessment and Plan    Problem List Items Addressed This Visit          Gastrointestinal Abdominal     GERD (gastroesophageal reflux disease)    Relevant Medications    dexlansoprazole  "(DEXILANT) 60 MG capsule       Pulmonary and Pneumonias    Stage II (Moderate) COPD - Primary    Relevant Medications    tiotropium (Spiriva HandiHaler) 18 MCG per inhalation capsule    sodium chloride 3 % nebulizer solution    Other Relevant Orders    XR Chest PA & Lateral    OSCILLATING POSITIVE EXIRATORY PRESSURE (OPEP)    Chronic respiratory failure with hypoxia    Relevant Orders    Overnight Sleep Oximetry Study    Chronic cough    Relevant Orders    CT Chest Hi Resolution Diagnostic    OSCILLATING POSITIVE EXIRATORY PRESSURE (OPEP)       Mr. Bass's been having some worsening issues with a productive cough and shortness of breath lately.    There is been no significant changes in his moderate obstructive airway disease on his PFTs today.  There is no acute findings on his chest x-ray.    While talking about his symptoms it appears he has had having worsening reflux and aspiration episodes recently.  We did discuss this likely is the cause of his worsening cough and \"recurrent bronchitis\" episodes.  We discussed how chronic reflux and microaspiration can cause chronic airway irritation with a cough and sputum production.    He has tried multiple inhalers in the past with side effects, since he has been having some issues clearing his secretions him new put him on saline nebulizers with a flutter valve.  I would also like to follow-up on high-resolution CT scan to see if he has evidence of bronchiectasis with his chronic reflux and aspiration.    We did review his 6-minute walk test today, he was very borderline most of the walk he was 89 to 90%.  He does have oxygen at home in case he needs it, I did encourage him to monitor his oxygen saturations at home.  I would like to go ahead and follow-up an overnight pulse oximetry on room air to get a better idea of any underlying hypoxemia, especially since he has not been using his oxygen.      Follow Up     No follow-ups on file.  Patient was given instructions " and counseling regarding his condition or for health maintenance advice. Please see specific information pulled into the AVS if appropriate.     I spent 38 minutes caring for Som on this date of service. This time includes time spent by me in the following activities:preparing for the visit, reviewing tests, obtaining and/or reviewing a separately obtained history, performing a medically appropriate examination and/or evaluation , counseling and educating the patient/family/caregiver, ordering medications, tests, or procedures, referring and communicating with other health care professionals , documenting information in the medical record, and independently interpreting results and communicating that information with the patient/family/caregiver    Excluding time spent on other separate services such as performing procedures or test interpretation, if applicable    Moderate level of Medical Decision Making complexity based on 2 or more chronic stable illnesses and prescription drug management.    MOHAN Longoria, ACNP  [facility]  Electronically signed

## 2025-03-10 ENCOUNTER — TELEPHONE (OUTPATIENT)
Dept: PULMONOLOGY | Facility: CLINIC | Age: 80
End: 2025-03-10
Payer: MEDICARE

## 2025-03-10 NOTE — TELEPHONE ENCOUNTER
Pt's daughter called asking if there was alternate medication for Dexlansoprazole (Dexilant). Advise pt's daughter to call insurance for more cost effective options. Pt's daughter verbalized understanding.

## 2025-03-14 ENCOUNTER — HOSPITAL ENCOUNTER (OUTPATIENT)
Dept: CT IMAGING | Facility: HOSPITAL | Age: 80
Discharge: HOME | End: 2025-03-14
Payer: MEDICARE

## 2025-03-14 DIAGNOSIS — R05.3 CHRONIC COUGH: ICD-10-CM

## 2025-03-14 PROCEDURE — 71250 CT THORAX DX C-: CPT

## 2025-03-18 ENCOUNTER — TELEMEDICINE (OUTPATIENT)
Dept: PULMONOLOGY | Facility: CLINIC | Age: 80
End: 2025-03-18
Payer: MEDICARE

## 2025-03-18 DIAGNOSIS — R05.3 CHRONIC COUGH: ICD-10-CM

## 2025-03-18 DIAGNOSIS — J44.9 COPD MIXED TYPE: Primary | ICD-10-CM

## 2025-03-18 DIAGNOSIS — K21.9 GASTROESOPHAGEAL REFLUX DISEASE, UNSPECIFIED WHETHER ESOPHAGITIS PRESENT: ICD-10-CM

## 2025-03-18 PROCEDURE — 99213 OFFICE O/P EST LOW 20 MIN: CPT | Performed by: NURSE PRACTITIONER

## 2025-03-18 PROCEDURE — 1159F MED LIST DOCD IN RCRD: CPT | Performed by: NURSE PRACTITIONER

## 2025-03-18 PROCEDURE — 1160F RVW MEDS BY RX/DR IN RCRD: CPT | Performed by: NURSE PRACTITIONER

## 2025-03-18 NOTE — PROGRESS NOTES
[facility] Pulmonary Video Visit    CHIEF COMPLAINT    CT follow-up        Subjective   HISTORY OF PRESENT ILLNESS    Som Bass is a 80 y.o.male was seen via Video Visit today for follow-up on a high-resolution CT scan of the chest.  Unfortunately he did have technical difficulties and I was able to talk to his daughter on the phone.    I saw him earlier this month with worsening cough and congestion as well as shortness of breath.  He does have underlying COPD and has tried inhalers in the past without much benefit, and they are very costly.  He has not been using his albuterol nebulizer secondary to a tremor.    During his evaluation he also had note of a hiatal hernia with difficulty swallowing and worsening reflux and aspiration episodes.    I did go ahead and follow-up on a high-resolution CT scan for further evaluation of any underlying lung disease causing his worsening cough.      His daughter said on the phone that he did have another episode last week where he acutely started coughing went to his primary care provider but was better the next day.    He is not adhering to reflux precautions.  He did not  the Dexilant due to it being too expensive but did increase his Protonix to twice daily.          MEDICATION LIST AND ALLERGIES REVIEWED.    FAMILY AND SOCIAL HISTORY REVIEWED.      Objective       Immunization History   Administered Date(s) Administered    COVID-19 (PFIZER) Purple Cap Monovalent 01/29/2021, 02/19/2021, 10/11/2021, 11/15/2022    Fluzone High-Dose 65+YRS 11/13/2020, 11/22/2021, 11/15/2022    Influenza MDCK Quadrivalent with Preserative 01/10/2024    Pneumococcal Conjugate 13-Valent (PCV13) 11/13/2020         Physical exam unable to be completed secondary to nature visit  Appeared in no acute distress, no difficulty with conversation.    Oriented to person, place and time.   Normal respiratory effort.        RESULTS        Assessment & Plan     PROBLEM LIST    Problem List Items  Addressed This Visit          Gastrointestinal Abdominal     GERD (gastroesophageal reflux disease)       Pulmonary and Pneumonias    Stage II (Moderate) COPD - Primary    Chronic cough         DISCUSSION      We are still awaiting final read from his high-resolution CT scan, however there is some mild interstitial changes as well as emphysematous changes.  No significant pulmonary fibrosis.    We did discuss his chronic and recurrent bronchitis symptoms could be associated with his COPD and chronic bronchitis, but is likely related to his worsening chronic reflux and aspiration.    We discussed the importance of continuing on his PPI as well as trying to adhere to reflux precautions.  She is hesitant to think that he would actually do the reflux precautions, he lives alone and has a poor diet.    They would like to continue follow-up with their primary care locally for acute episodes.        Mode of Visit: Video  Location of patient: -HOME-  Location of provider: [facility]  You have chosen to receive care through a telehealth visit.  The patient has signed the video visit consent form.  The visit included audio and video interaction. No technical issues occurred during this visit.          Neha Morris, MOHAN  03/18/202510:26 EDT  Electronically signed     Please note that portions of this note were completed with a voice recognition program. Efforts were made to edit the dictations, but occasionally words are mistranscribed.      CC: Kirt Cano, DO